# Patient Record
Sex: FEMALE | Race: WHITE | NOT HISPANIC OR LATINO | Employment: UNEMPLOYED | URBAN - METROPOLITAN AREA
[De-identification: names, ages, dates, MRNs, and addresses within clinical notes are randomized per-mention and may not be internally consistent; named-entity substitution may affect disease eponyms.]

---

## 2017-10-22 ENCOUNTER — TELEPHONE (OUTPATIENT)
Dept: UROLOGY | Facility: CLINIC | Age: 32
End: 2017-10-22

## 2017-10-22 NOTE — TELEPHONE ENCOUNTER
This pt was seen at [facility] today but note was written into [facility] for convenience and making future follow-up easier. It was copied over to [facility] notes.       [facility] Note - [place]  Staff: MD Carol    Referring provider and please cc: Patricia  PCP: none    [facility]: inactive    Chief Complaint: left flank pain     Subjective:      HPI: Noreen Kat is a 32 y.o. female presents with     Pt is here with  who is translating for her.     Left flank pain 10/10 to [facility] ER. CTRSS today showed a left proximal 7.x10mm stone with moderate hydro. Cr 0.58. Wbc 6.6, ua shows moderate blood, no leukocytes and no nitrites.     She was seen 2 months ago on 8/20/17 with similar complaints. CT scan showed the same stone with only mild hydro in similar position. ua at that time showed blood, tr protein and no leukocytes. Wbc was normal. cr was normal and ucx grew contaminants.  She was sent home with flomax and percocet with outpt f/u but pt had no insurance.     She returned today with increasing pain and concern that she had not passed stone. She denies any fever, frequency or dysuria. She says after pain medicine her pain is now 4/10.     This is her first stone  No family hx of stones    ECOG Status: 0      REVIEW OF SYSTEMS:  General ROS: no fevers, no chills  Psychological ROS: no depression  Endocrine ROS: no heat or cold  Respiratory ROS: no SOB  Cardiovascular ROS: no CP  Gastrointestinal ROS: no abdominal pain, no constipation, no diarrhea, noBRBPR  Musculoskeletal ROS: no muscle pain  Neurological ROS: no headaches  Dermatological ROS: no rashes  HEENT: noglasses, no sinus   ROS: per HPI     PMHx:  No past medical history on file.  Kidney stones: Yes     PSHx:  No past surgical history on file.  C-sxn    Stents/Valves/Foreign Bodies: No  Cardiac Evaluation: No    Screening Studies  Colonoscopy: none    Fam Hx:   malignancies: No , gyn malignancies: No .   kidney stones:  No     Soc Hx:  No tobacco.  No alcohol  Lives in [place]  :yes, here with   Children: 2  Occupation:unemployed    Allergies:  Patient has no allergy information on record.    Medications: reviewed   Anticoagulation: No    Objective:   No fevers    General:WDWN in NAD  Eyes: PERRLA, normal conjunctiva  Respiratory: no increased work on breathing, clear to auscultation  Cardiovascular: regular rate and rhythm. No obvious extremity edema.  GI: no palpation of masses. No tenderness. No hepatosplenomegaly to palpation.  Musculoskeletal: normal range of motion of bilateral upper extremities. Normal muscle strength and tone.  Skin: no obvious rashes or lesions. No tightening of skin noted.  Neurologic: CN grossly normal. Normal sensation.   Psychiatric: awake, alert and oriented x 3. Mood and affect normal. Cooperative.    Pelvic exam  deferred    LABS REVIEW:  UA today: 1.020/6/lrg blood  ua microscopic: squames, blood, bacteria  UCx:   8/21/17 Multiple org, 70k-100k GPC    Cr: No results found for: CREATININE    PATHOLOGY REVIEW:  none    RADIOGRAPHIC REVIEW:  kub 10/22/17 [facility], images reviewed  Reason: Left flank pain, 7 x 10 mm left ureteral stone and mild left hydronephrosis on CT FINDINGS: Supine abdomen compared with 10/22/2017 CT shows proximal left ureteral calculus measuring 10 x 5 mm lying at level of L3-L4 disc. Bowel gas pattern is nonobstructive. No intra-abdominal mass effect or organomegaly. No acute osseous abnormality. IMPRESSION: 10 x 5 mm left ureteral calculus lies at level of L3-L4 disc.    ctrss 10/22/17, [facility] images reviewed  Reason: Left flank pain, 7 x 10 mm left ureteral stone and mild left hydronephrosis on CT FINDINGS: Supine abdomen compared with 10/22/2017 CT shows proximal left ureteral calculus measuring 10 x 5 mm lying at level of L3-L4 disc. Bowel gas pattern is nonobstructive. No intra-abdominal mass effect or organomegaly. No acute osseous abnormality. IMPRESSION: 10 x 5 mm  left ureteral calculus lies at level of L3-L4 disc.    ctrss 8/21/17 [facility], images reviewed  FINDINGS: Axial images were obtained through the abdomen and pelvis without contrast using a renal stone protocol. Lack of contrast decreases sensitivity for inflammatory and neoplastic processes. There is no previous study available for comparison. There is mild left hydronephrosis and perinephric edema. 5 mm x 10 mm in length calculus is noted obstructing the proximal left ureter or UPJ. No additional renal calculus. Distal ureters normal in caliber. Urinary bladder unremarkable. No dominant adnexal mass visible. Small fat-containing periumbilical hernia. No bulky retroperitoneal adenopathy. Probable mild hepatic fatty infiltration. Gallbladder and solid upper abdominal viscera otherwise show no convincing acute process without contrast. There is no evidence of bowel obstruction, wall thickening or focal inflammation. There is no CT evidence to suggest appendicitis. No mass or ascites in the abdomen or pelvis. IMPRESSION 1. 5 mm x 10 mm in length calculus obstructs the proximal left ureter or UPJ with mild left hydronephrosis 2. Other incidental findings as above with no other convincing acute process on this limited noncontrasted study              Assessment:   Left proximal ureteral stone with mild to moderate hydro radioopaque on Kub    Plan:   Pt's pain is well controlled with po pain medicine. She has no indications of infection - her ua shows blood only, wbc is normal, she denies any fevers. ua microscopic shows bacteria but also is contaminated by vaginal epithelial cells.     She has had this stone for about 2 months and her cr is normal but I would recommend treatment within the next 1-3 weeks. I went over treatment options with her  and her including cysto/stent cysto/stent/possible ureteroscopy if able to reach stone or ESWL with possible stent. She has no insurance and since her pain is well controlled  and she has no fever she would prefer to be treated at [facility].    I have contacted , [facility], Urology and he will plan to see her Tuesday as an outpt to discuss treatment options. I counseled the pt that if she is unable to make this appt, if her pain worsens, if she is unable to tolerate any po intake and especially if she has fever she needs to return for stent here.  They understand and agree with plan. I gave them the phone number and contact for .    I have discussed this plan with  and the pt will be discharged on po pain meds with a copy of his labs and imaging on CD. I have also updated the pt's phone number and address in [employer] as I believe 's office has access to [employer].     I gave the pt a copy of kidney stone information and treatment (ureteroscopy and ESWL) in Qatari to review. I also printed them out directions to [facility].       Salma Medina MD

## 2019-12-12 ENCOUNTER — HOSPITAL ENCOUNTER (EMERGENCY)
Facility: HOSPITAL | Age: 34
Discharge: HOME | End: 2019-12-12
Attending: EMERGENCY MEDICINE

## 2019-12-12 VITALS
TEMPERATURE: 98 F | DIASTOLIC BLOOD PRESSURE: 90 MMHG | HEART RATE: 72 BPM | OXYGEN SATURATION: 98 % | SYSTOLIC BLOOD PRESSURE: 146 MMHG | HEIGHT: 64 IN | RESPIRATION RATE: 20 BRPM

## 2019-12-12 DIAGNOSIS — M54.50 ACUTE BILATERAL LOW BACK PAIN WITHOUT SCIATICA: Primary | ICD-10-CM

## 2019-12-12 LAB
ALBUMIN SERPL BCP-MCNC: 4.4 G/DL (ref 3.5–5.2)
ALP SERPL-CCNC: 75 U/L (ref 55–135)
ALT SERPL W/O P-5'-P-CCNC: 21 U/L (ref 10–44)
ANION GAP SERPL CALC-SCNC: 7 MMOL/L (ref 8–16)
AST SERPL-CCNC: 22 U/L (ref 10–40)
B-HCG UR QL: NEGATIVE
BASOPHILS # BLD AUTO: 0.03 K/UL (ref 0–0.2)
BASOPHILS NFR BLD: 0.5 % (ref 0–1.9)
BILIRUB SERPL-MCNC: 0.6 MG/DL (ref 0.1–1)
BILIRUB UR QL STRIP: NEGATIVE
BUN SERPL-MCNC: 11 MG/DL (ref 6–20)
CALCIUM SERPL-MCNC: 9.9 MG/DL (ref 8.7–10.5)
CHLORIDE SERPL-SCNC: 101 MMOL/L (ref 95–110)
CLARITY UR: CLEAR
CO2 SERPL-SCNC: 28 MMOL/L (ref 23–29)
COLOR UR: YELLOW
CREAT SERPL-MCNC: 0.5 MG/DL (ref 0.5–1.4)
CTP QC/QA: YES
DIFFERENTIAL METHOD: ABNORMAL
EOSINOPHIL # BLD AUTO: 0.2 K/UL (ref 0–0.5)
EOSINOPHIL NFR BLD: 3 % (ref 0–8)
ERYTHROCYTE [DISTWIDTH] IN BLOOD BY AUTOMATED COUNT: 13.4 % (ref 11.5–14.5)
EST. GFR  (AFRICAN AMERICAN): >60 ML/MIN/1.73 M^2
EST. GFR  (NON AFRICAN AMERICAN): >60 ML/MIN/1.73 M^2
GLUCOSE SERPL-MCNC: 101 MG/DL (ref 70–110)
GLUCOSE UR QL STRIP: NEGATIVE
HCT VFR BLD AUTO: 42.2 % (ref 37–48.5)
HGB BLD-MCNC: 13.2 G/DL (ref 12–16)
HGB UR QL STRIP: NEGATIVE
IMM GRANULOCYTES # BLD AUTO: 0.01 K/UL (ref 0–0.04)
IMM GRANULOCYTES NFR BLD AUTO: 0.2 % (ref 0–0.5)
KETONES UR QL STRIP: NEGATIVE
LEUKOCYTE ESTERASE UR QL STRIP: NEGATIVE
LYMPHOCYTES # BLD AUTO: 2.3 K/UL (ref 1–4.8)
LYMPHOCYTES NFR BLD: 39.1 % (ref 18–48)
MCH RBC QN AUTO: 24.9 PG (ref 27–31)
MCHC RBC AUTO-ENTMCNC: 31.3 G/DL (ref 32–36)
MCV RBC AUTO: 80 FL (ref 82–98)
MONOCYTES # BLD AUTO: 0.5 K/UL (ref 0.3–1)
MONOCYTES NFR BLD: 8.4 % (ref 4–15)
NEUTROPHILS # BLD AUTO: 2.9 K/UL (ref 1.8–7.7)
NEUTROPHILS NFR BLD: 48.8 % (ref 38–73)
NITRITE UR QL STRIP: NEGATIVE
NRBC BLD-RTO: 0 /100 WBC
PH UR STRIP: 5 [PH] (ref 5–8)
PLATELET # BLD AUTO: 404 K/UL (ref 150–350)
PMV BLD AUTO: 9.8 FL (ref 9.2–12.9)
POTASSIUM SERPL-SCNC: 4.1 MMOL/L (ref 3.5–5.1)
PROT SERPL-MCNC: 8.4 G/DL (ref 6–8.4)
PROT UR QL STRIP: NEGATIVE
RBC # BLD AUTO: 5.31 M/UL (ref 4–5.4)
SODIUM SERPL-SCNC: 136 MMOL/L (ref 136–145)
SP GR UR STRIP: 1.01 (ref 1–1.03)
URN SPEC COLLECT METH UR: NORMAL
UROBILINOGEN UR STRIP-ACNC: NEGATIVE EU/DL
WBC # BLD AUTO: 5.96 K/UL (ref 3.9–12.7)

## 2019-12-12 PROCEDURE — 63600175 PHARM REV CODE 636 W HCPCS: Performed by: EMERGENCY MEDICINE

## 2019-12-12 PROCEDURE — 96375 TX/PRO/DX INJ NEW DRUG ADDON: CPT

## 2019-12-12 PROCEDURE — 80053 COMPREHEN METABOLIC PANEL: CPT

## 2019-12-12 PROCEDURE — 81003 URINALYSIS AUTO W/O SCOPE: CPT

## 2019-12-12 PROCEDURE — 96374 THER/PROPH/DIAG INJ IV PUSH: CPT

## 2019-12-12 PROCEDURE — 99285 EMERGENCY DEPT VISIT HI MDM: CPT | Mod: 25

## 2019-12-12 PROCEDURE — 81025 URINE PREGNANCY TEST: CPT | Performed by: PHYSICIAN ASSISTANT

## 2019-12-12 PROCEDURE — 85025 COMPLETE CBC W/AUTO DIFF WBC: CPT

## 2019-12-12 RX ORDER — KETOROLAC TROMETHAMINE 30 MG/ML
15 INJECTION, SOLUTION INTRAMUSCULAR; INTRAVENOUS
Status: COMPLETED | OUTPATIENT
Start: 2019-12-12 | End: 2019-12-12

## 2019-12-12 RX ORDER — ONDANSETRON 2 MG/ML
4 INJECTION INTRAMUSCULAR; INTRAVENOUS
Status: COMPLETED | OUTPATIENT
Start: 2019-12-12 | End: 2019-12-12

## 2019-12-12 RX ORDER — HYDROMORPHONE HYDROCHLORIDE 1 MG/ML
0.5 INJECTION, SOLUTION INTRAMUSCULAR; INTRAVENOUS; SUBCUTANEOUS
Status: COMPLETED | OUTPATIENT
Start: 2019-12-12 | End: 2019-12-12

## 2019-12-12 RX ORDER — METHOCARBAMOL 500 MG/1
1000 TABLET, FILM COATED ORAL 3 TIMES DAILY PRN
Qty: 30 TABLET | Refills: 0 | Status: SHIPPED | OUTPATIENT
Start: 2019-12-12 | End: 2019-12-17

## 2019-12-12 RX ADMIN — ONDANSETRON 4 MG: 2 INJECTION INTRAMUSCULAR; INTRAVENOUS at 04:12

## 2019-12-12 RX ADMIN — KETOROLAC TROMETHAMINE 15 MG: 30 INJECTION, SOLUTION INTRAMUSCULAR at 04:12

## 2019-12-12 RX ADMIN — HYDROMORPHONE HYDROCHLORIDE 0.5 MG: 1 INJECTION, SOLUTION INTRAMUSCULAR; INTRAVENOUS; SUBCUTANEOUS at 04:12

## 2019-12-12 NOTE — ED PROVIDER NOTES
Encounter Date: 12/12/2019       History     Chief Complaint   Patient presents with    Back Pain     Patient with a history of kidney stone needing intervention approximately 2 years ago.  Patient reports approximately 2 week history of low back pain. Patient seen at another provider and was placed on muscle relaxers and Bactrim for possible urinary tract infection.  Patient denies any dysuria or hematuria.  Pain increases with movement.  There was no trauma. No urinary or bowel incontinence.  No trouble with ambulation.  No radiation to buttocks or legs.  No leg weakness.        Review of patient's allergies indicates:  No Known Allergies  No past medical history on file.  No past surgical history on file.  No family history on file.  Social History     Tobacco Use    Smoking status: Not on file   Substance Use Topics    Alcohol use: Not on file    Drug use: Not on file     Review of Systems   Constitutional: Negative for chills and fever.   HENT: Negative for congestion.    Eyes: Negative for visual disturbance.   Respiratory: Negative for shortness of breath.    Cardiovascular: Negative for chest pain and palpitations.   Gastrointestinal: Negative for abdominal pain and vomiting.   Genitourinary: Negative for dysuria.   Musculoskeletal: Negative for joint swelling.   Neurological: Negative for headaches.   Psychiatric/Behavioral: Negative for confusion.       Physical Exam     Initial Vitals [12/12/19 1327]   BP Pulse Resp Temp SpO2   (!) 160/91 91 20 97.8 °F (36.6 °C) 99 %      MAP       --         Physical Exam    Nursing note and vitals reviewed.  Constitutional: She is not diaphoretic. No distress.   HENT:   Head: Normocephalic and atraumatic.   Eyes: Conjunctivae are normal.   Neck: Normal range of motion.   Cardiovascular: Normal rate.   Pulmonary/Chest: Breath sounds normal.   Abdominal: Soft. Bowel sounds are normal. There is no tenderness.   Completely nontender to deep palpation. No CVA tenderness.   There is some reproducible left paralumbar tenderness. Negative straight leg raise for both legs.  2+ dorsalis pedis and posterior tibial pulses. Full strength and sensation both legs.   Musculoskeletal: Normal range of motion.   Neurological: She is alert.   No gross deficits   Skin: No rash noted.   Psychiatric: She has a normal mood and affect.         ED Course   Procedures  Labs Reviewed   CBC W/ AUTO DIFFERENTIAL   COMPREHENSIVE METABOLIC PANEL   URINALYSIS, REFLEX TO URINE CULTURE   POCT URINE PREGNANCY          Imaging Results          CT Renal Stone Study ABD Pelvis WO (Final result)  Result time 12/12/19 14:40:43    Final result by Harish Mike MD (12/12/19 14:40:43)                 Impression:      1.  No hydronephrosis, hydroureter or evidence of obstructive uropathy.    2.  Nonobstructing 2 mm interpolar left renal stone.      Electronically signed by: Harish Mike MD  Date:    12/12/2019  Time:    14:40             Narrative:      CMS MANDATED QUALITY DATA - CT RADIATION - 436    All CT scans at this facility utilize dose modulation, iterative reconstruction, and/or weight based dosing when appropriate to reduce radiation dose to as low as reasonably achievable.    CLINICAL HISTORY:  ([id number])33 y/o  (1985) F    Flank pain, recurrent stone disease suspected;    TECHNIQUE:  (A#[id number], exam time 12/12/2019 14:32)    CT RENAL STONE STUDY ABD PELVIS WO [id number]    Axial CT images of the abdomen and pelvis were obtained from the dome of the diaphragm to the proximal thighs.    COMPARISON:  CT most recently from 10/22/2017    FINDINGS:  Lower Thorax:    The lung bases are clear. The heart is normal in size.    CT Abdomen:    Liver: The liver is normal in size and imaging appearance.    Gallbladder: The gallbladder is within normal limits.    Biliary Tree: No intra or extrahepatic ductal dilation.    Spleen: Normal.    Pancreas: The pancreas is normal.    Adrenal Glands: Normal    Kidneys:  The kidneys show no hydronephrosis, hydroureter or evidence of obstructive uropathy.  There is a 2 mm non-obstructing interpolar left renal stone.    Vasculature: Normal.    Lymph nodes: No abdominal lymphadenopathy is seen.    Intraperitoneal structures: There is no ascites.    Bowel: The partially filled bowel is within normal limits with a nonobstructive bowel gas pattern. The appendix is normal (image 146)    Abdominal wall: Moderate-size fat containing ventral abdominal wall/umbilical hernia.    Musculoskeletal: Normal.    CT Pelvis:    Bladder: Normal.    Reproductive Organs: The uterus and ovaries are unremarkable.    Pelvic Lymph nodes: No pelvic lymphadenopathy or pelvic mass is identified.                                 Medical Decision Making:   History:   Old Medical Records: I decided to obtain old medical records.  Clinical Tests:   Lab Tests: Reviewed  Radiological Study: Reviewed  ED Management:  Patient presents with musculoskeletal low back pain.  CT scan ordered from triage for possible ureteral calculus.  There is a nonobstructing calculus.  Currently there is no evidence urinary tract infection.  Patient feeling better after treatment here.  Continue muscle relaxer.  Patient is scheduled for primary care physician on Monday.                   ED Course as of Dec 12 1601   Thu Dec 12, 2019   1327 Low back pain for several weeks. Not relieved with meds called in last week.    H/O stone disease. Does not feel like kidney stone pain      [BF]      ED Course User Index  [BF] LAURA Bahena                Clinical Impression:       ICD-10-CM ICD-9-CM   1. Acute bilateral low back pain without sciatica M54.5 724.2     338.19                             William Ochoa MD  12/12/19 1737

## 2019-12-12 NOTE — ED NOTES
STATES PAIN ACROSS LOW BACK AND UPPER BUTTOCKS.  DENIES PAINFUL URINATION. Kinyarwanda SPEAKING PT WITH ENGLISH SPEAKING FAMILY AT BS.  MARTTI BROUGHT TO BS POST ACCEPTANCE BY PT/FAMILY.  NON DISTENDED OBESE ABDOMEN.  UA REQUESTED AT ROOM ARRIVAL.  NO SPECIMEN AVAILABLE

## 2022-01-17 ENCOUNTER — HOSPITAL ENCOUNTER (EMERGENCY)
Facility: HOSPITAL | Age: 37
Discharge: HOME | End: 2022-01-17
Attending: EMERGENCY MEDICINE

## 2022-01-17 VITALS
DIASTOLIC BLOOD PRESSURE: 93 MMHG | WEIGHT: 195 LBS | HEART RATE: 76 BPM | SYSTOLIC BLOOD PRESSURE: 160 MMHG | RESPIRATION RATE: 16 BRPM | BODY MASS INDEX: 33.47 KG/M2 | OXYGEN SATURATION: 99 % | TEMPERATURE: 98 F

## 2022-01-17 DIAGNOSIS — N83.201 CYST OF RIGHT OVARY: Primary | ICD-10-CM

## 2022-01-17 LAB
ALBUMIN SERPL BCP-MCNC: 4.1 G/DL (ref 3.5–5.2)
ALP SERPL-CCNC: 83 U/L (ref 55–135)
ALT SERPL W/O P-5'-P-CCNC: 16 U/L (ref 10–44)
ANION GAP SERPL CALC-SCNC: 10 MMOL/L (ref 8–16)
AST SERPL-CCNC: 14 U/L (ref 10–40)
B-HCG UR QL: NEGATIVE
BASOPHILS # BLD AUTO: 0.03 K/UL (ref 0–0.2)
BASOPHILS NFR BLD: 0.4 % (ref 0–1.9)
BILIRUB SERPL-MCNC: 0.4 MG/DL (ref 0.1–1)
BILIRUB UR QL STRIP: NEGATIVE
BUN SERPL-MCNC: 19 MG/DL (ref 6–20)
CALCIUM SERPL-MCNC: 9.2 MG/DL (ref 8.7–10.5)
CHLORIDE SERPL-SCNC: 101 MMOL/L (ref 95–110)
CLARITY UR: CLEAR
CO2 SERPL-SCNC: 26 MMOL/L (ref 23–29)
COLOR UR: YELLOW
CREAT SERPL-MCNC: 0.5 MG/DL (ref 0.5–1.4)
CTP QC/QA: YES
DIFFERENTIAL METHOD: ABNORMAL
EOSINOPHIL # BLD AUTO: 0.3 K/UL (ref 0–0.5)
EOSINOPHIL NFR BLD: 3.8 % (ref 0–8)
ERYTHROCYTE [DISTWIDTH] IN BLOOD BY AUTOMATED COUNT: 24 % (ref 11.5–14.5)
EST. GFR  (AFRICAN AMERICAN): >60 ML/MIN/1.73 M^2
EST. GFR  (NON AFRICAN AMERICAN): >60 ML/MIN/1.73 M^2
GLUCOSE SERPL-MCNC: 108 MG/DL (ref 70–110)
GLUCOSE UR QL STRIP: NEGATIVE
HCT VFR BLD AUTO: 42.7 % (ref 37–48.5)
HGB BLD-MCNC: 13 G/DL (ref 12–16)
HGB UR QL STRIP: NEGATIVE
IMM GRANULOCYTES # BLD AUTO: 0.01 K/UL (ref 0–0.04)
IMM GRANULOCYTES NFR BLD AUTO: 0.1 % (ref 0–0.5)
KETONES UR QL STRIP: NEGATIVE
LEUKOCYTE ESTERASE UR QL STRIP: NEGATIVE
LYMPHOCYTES # BLD AUTO: 3.2 K/UL (ref 1–4.8)
LYMPHOCYTES NFR BLD: 46.6 % (ref 18–48)
MCH RBC QN AUTO: 23.3 PG (ref 27–31)
MCHC RBC AUTO-ENTMCNC: 30.4 G/DL (ref 32–36)
MCV RBC AUTO: 77 FL (ref 82–98)
MONOCYTES # BLD AUTO: 0.7 K/UL (ref 0.3–1)
MONOCYTES NFR BLD: 9.5 % (ref 4–15)
NEUTROPHILS # BLD AUTO: 2.7 K/UL (ref 1.8–7.7)
NEUTROPHILS NFR BLD: 39.6 % (ref 38–73)
NITRITE UR QL STRIP: NEGATIVE
NRBC BLD-RTO: 0 /100 WBC
PH UR STRIP: 7 [PH] (ref 5–8)
PLATELET # BLD AUTO: 384 K/UL (ref 150–450)
PMV BLD AUTO: 9.4 FL (ref 9.2–12.9)
POTASSIUM SERPL-SCNC: 3.7 MMOL/L (ref 3.5–5.1)
PROT SERPL-MCNC: 7.8 G/DL (ref 6–8.4)
PROT UR QL STRIP: ABNORMAL
RBC # BLD AUTO: 5.58 M/UL (ref 4–5.4)
SODIUM SERPL-SCNC: 137 MMOL/L (ref 136–145)
SP GR UR STRIP: 1.03 (ref 1–1.03)
URN SPEC COLLECT METH UR: ABNORMAL
UROBILINOGEN UR STRIP-ACNC: NEGATIVE EU/DL
WBC # BLD AUTO: 6.85 K/UL (ref 3.9–12.7)

## 2022-01-17 PROCEDURE — 81003 URINALYSIS AUTO W/O SCOPE: CPT | Performed by: EMERGENCY MEDICINE

## 2022-01-17 PROCEDURE — 25500020 PHARM REV CODE 255: Performed by: EMERGENCY MEDICINE

## 2022-01-17 PROCEDURE — 81025 URINE PREGNANCY TEST: CPT | Performed by: EMERGENCY MEDICINE

## 2022-01-17 PROCEDURE — 96361 HYDRATE IV INFUSION ADD-ON: CPT

## 2022-01-17 PROCEDURE — 25000003 PHARM REV CODE 250: Performed by: EMERGENCY MEDICINE

## 2022-01-17 PROCEDURE — 80053 COMPREHEN METABOLIC PANEL: CPT | Performed by: EMERGENCY MEDICINE

## 2022-01-17 PROCEDURE — 96360 HYDRATION IV INFUSION INIT: CPT

## 2022-01-17 PROCEDURE — 85025 COMPLETE CBC W/AUTO DIFF WBC: CPT | Performed by: EMERGENCY MEDICINE

## 2022-01-17 PROCEDURE — 99285 EMERGENCY DEPT VISIT HI MDM: CPT | Mod: 25

## 2022-01-17 RX ADMIN — IOHEXOL 100 ML: 350 INJECTION, SOLUTION INTRAVENOUS at 02:01

## 2022-01-17 RX ADMIN — SODIUM CHLORIDE 1000 ML: 0.9 INJECTION, SOLUTION INTRAVENOUS at 12:01

## 2022-01-17 NOTE — ED PROVIDER NOTES
Encounter Date: 2022       History     Chief Complaint   Patient presents with    Abdominal Pain     RLQ pain. Started yesterday. Denies N/V     Diarrhea     Chief complaint is abdominal pain.  The patient noticed discussed a diffuse abdominal pain that is not she is to the right lower quadrant.  Particular pain started Saturday 4:00 p.m. is now 1:00 a.m. on Monday morning the patient has decreased appetite.  He also had an episode of diarrhea.  The only surgery she has had is .  She has no allergies.  She takes no medicines.        Review of patient's allergies indicates:  No Known Allergies  No past medical history on file.  No past surgical history on file.  No family history on file.     Review of Systems   Constitutional: Negative for chills and fever.   HENT: Negative for ear pain, rhinorrhea and sore throat.    Eyes: Negative for pain and visual disturbance.   Respiratory: Negative for cough and shortness of breath.    Cardiovascular: Negative for chest pain and palpitations.   Gastrointestinal: Positive for abdominal pain and diarrhea. Negative for constipation, nausea and vomiting.   Genitourinary: Negative for dysuria, frequency, hematuria and urgency.   Musculoskeletal: Negative for back pain, joint swelling and myalgias.   Skin: Negative for rash.   Neurological: Negative for dizziness, seizures, weakness and headaches.   Psychiatric/Behavioral: Negative for dysphoric mood. The patient is not nervous/anxious.        Physical Exam     Initial Vitals [22 0021]   BP Pulse Resp Temp SpO2   (!) 170/102 84 20 98 °F (36.7 °C) 100 %      MAP       --         Physical Exam    Nursing note and vitals reviewed.  Constitutional: She appears well-developed and well-nourished.   HENT:   Head: Normocephalic and atraumatic.   Eyes: Conjunctivae, EOM and lids are normal. Pupils are equal, round, and reactive to light.   Neck: Trachea normal. Neck supple. No thyroid mass and no thyromegaly present.    Normal range of motion.  Cardiovascular: Normal rate, regular rhythm and normal heart sounds.   Pulmonary/Chest: Effort normal and breath sounds normal.   Abdominal: Abdomen is soft. Normal appearance and bowel sounds are normal. There is no abdominal tenderness.   Patient tender right lower quadrant.  Positive Dunphy test.  Positive pain to the right lower quadrant on heel tap.  Negative obturator positive iliopsoas   Musculoskeletal:         General: Normal range of motion.      Cervical back: Normal range of motion and neck supple.     Neurological: She is alert and oriented to person, place, and time. She has normal strength and normal reflexes. No cranial nerve deficit or sensory deficit.   Skin: Skin is warm and dry.   Psychiatric: She has a normal mood and affect. Her speech is normal and behavior is normal. Judgment and thought content normal.         ED Course   Procedures  Labs Reviewed   CBC W/ AUTO DIFFERENTIAL - Abnormal; Notable for the following components:       Result Value    RBC 5.58 (*)     MCV 77 (*)     MCH 23.3 (*)     MCHC 30.4 (*)     RDW 24.0 (*)     All other components within normal limits   URINALYSIS, REFLEX TO URINE CULTURE - Abnormal; Notable for the following components:    Protein, UA Trace (*)     All other components within normal limits    Narrative:     Specimen Source->Urine   COMPREHENSIVE METABOLIC PANEL   POCT URINE PREGNANCY          Imaging Results          US Pelvis Complete Non OB (Final result)  Result time 01/17/22 04:34:04    Final result by Andrey Alas MD (01/17/22 04:34:04)                 Narrative:    Ultrasound pelvis on 1/17/2022    Clinical indications: Right lower quadrant pain, follow-up abnormal CT    COMPARISON: CT from 1/17/2022    FINDINGS: Multiple sonographic images are obtained throughout the pelvis by transabdominal approach only, both transverse and sagittal images are obtained. The uterus measures approximately 8.8 x 4.4 x 4.7 cm.  Endometrial stripe measures 8 mm which is within normal limits. Uterine myometrium appears homogeneous. The left ovary measures approximately 3.7 x 2.4 x 3.2 cm. Flow is demonstrated in the left ovary. Right ovary measures approximate 4.9 x 4.8 x 4.8 cm. Flow is demonstrated in the right ovary. Within the right ovary there is a 4.2 x 3.8 x 4.3 cm cyst with some thin internal septations and no internal blood flow consistent with slightly complex right ovarian cyst. Due to the multiple septations would recommend surgical evaluation. This also could be better evaluated with transvaginal imaging if indicated.    IMPRESSION:  1. 4.3 cm right ovarian indeterminate cyst. Recommend surgical evaluation.  Reference: Radiology 2010 Sep;256(3):943-54  2. No evidence of ovarian torsion.    Electronically signed by:  Logan Alas  1/17/2022 4:34 AM CST Workstation: [id number]                             CT Abdomen Pelvis With Contrast (Final result)  Result time 01/17/22 02:38:31    Final result by Tanvir Trevino DO (01/17/22 02:38:31)                 Narrative:    EXAM:  CT Abdomen and Pelvis With Intravenous Contrast    FACILITY:  [facility]    REFERRING:  AAAREFERRAL SELF    CLINICAL HISTORY:  36 years, Female; RLQ abdominal pain (Age >= 14y)    TECHNIQUE:  Axial computed tomography images of the abdomen and pelvis with intravenous contrast.  This CT exam was performed using one or more of the following dose reduction techniques:  automated exposure control, adjustment of the mA and/or kV according to patient size, and/or use of iterative reconstruction technique.    COMPARISON:  12/12/2019    FINDINGS:  Lung bases:  Unremarkable.  No mass.  No consolidation.    ABDOMEN:  Liver:  Unremarkable.  No mass.  Gallbladder and bile ducts:  The gallbladder is contracted with no evidence of calculus.  No ductal dilation.  Pancreas:  Unremarkable.  No mass.  No ductal dilation.  Spleen:  Unremarkable.  No  splenomegaly.  Adrenals:  Unremarkable.  No mass.  Kidneys and ureters:  There is a subcentimeter cyst within the lower pole the left kidney as well as within the midpole the right kidney.  No solid mass.  No hydronephrosis.  Stomach and bowel:  There is a  mild to moderate increased amount of stool within the colon.  No obstruction.  No mucosal thickening.    PELVIS:  Appendix:  The appendix is normal in appearance.  Bladder:  Unremarkable.  No mass.  Reproductive:  There is a right ovarian cyst measuring up to 4.5 x 5.1 cm. Given the size of the cyst and a history of right lower quadrant pain, additional assessment with pelvic ultrasound would be recommended to exclude the possibility of ovarian torsion.    ABDOMEN and PELVIS:  Intraperitoneal space:  Unremarkable.  No free air.  No significant fluid collection.  Bones/joints:  No acute fracture.  No dislocation.  Soft tissues:  There is a fat-containing periumbilical hernia measuring 2.4 x 2.7 cm.  Vasculature:  Unremarkable.  No abdominal aortic aneurysm.  Lymph nodes:  Unremarkable.  No enlarged lymph nodes.    IMPRESSION:    There is a right ovarian cyst measuring up to 4.5 x 5.1 cm. Given the size of the cyst and a history of right lower quadrant pain, additional assessment with pelvic ultrasound would be recommended to exclude the possibility of ovarian torsion.    Fat-containing periumbilical hernia measuring 2.4 x 2.7 cm.    Mild to moderate increased amount of stool within the colon.    Normal appendix.    Electronically signed by:  Tanvir Trevino DO  1/17/2022 2:38 AM [facility] Workstation: [id number]                               Medications   sodium chloride 0.9% bolus 1,000 mL (0 mLs Intravenous Stopped 1/17/22 0316)   iohexoL (OMNIPAQUE 350) injection 100 mL (100 mLs Intravenous Given 1/17/22 0216)     Medical Decision Making:   ED Management:  The patient had a negative CT for appendicitis but she has a right ovarian cyst ultrasound shows a right ovarian  cyst good flow to both ovaries.  I will review the ultrasound report with the radiologist and then make final disposition.  The patient per the radiologist only needs a consult as I was going to do.  The patient will be discharged to follow-up with an OBGYN doctor and return if any worsening symptoms.                      Clinical Impression:   Final diagnoses:  [N83.201] Cyst of right ovary (Primary)          ED Disposition Condition    Discharge Stable        ED Prescriptions     None        Follow-up Information     Follow up With Specialties Details Why Contact Info    Abbey Fragoso NP Internal Medicine Schedule an appointment as soon as possible for a visit in 1 day  [address] [place] [address]  [phone]      [facility]  Schedule an appointment as soon as possible for a visit in 1 day  [address]  [place] [address]  [phone]             Janiya Gonzalez MD  01/17/22 0512

## 2022-01-17 NOTE — DISCHARGE INSTRUCTIONS
You need to see an OBGYN doctor to follow-up your right ovarian cyst.  Please return for any worsening pain weakness nausea vomiting passing out lost appetite fever chills